# Patient Record
Sex: FEMALE
[De-identification: names, ages, dates, MRNs, and addresses within clinical notes are randomized per-mention and may not be internally consistent; named-entity substitution may affect disease eponyms.]

---

## 2019-12-24 ENCOUNTER — HOSPITAL ENCOUNTER (OUTPATIENT)
Dept: HOSPITAL 62 - RAD | Age: 59
End: 2019-12-24
Attending: PHYSICIAN ASSISTANT
Payer: COMMERCIAL

## 2019-12-24 DIAGNOSIS — M47.894: ICD-10-CM

## 2019-12-24 DIAGNOSIS — M51.34: Primary | ICD-10-CM

## 2019-12-24 PROCEDURE — 72146 MRI CHEST SPINE W/O DYE: CPT

## 2019-12-24 NOTE — RADIOLOGY REPORT (SQ)
EXAM DESCRIPTION:  MRI THORACIC SPINE WITHOUT



COMPLETED DATE/TIME:  12/24/2019 11:24 am



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
AL DISC DEGENERATION, THORACIC WAYLON



COMPARISON:  None.



TECHNIQUE:  Sagittal and Axial imaging includes T1, T2, STIR and gradient echo sequences.



LIMITATIONS:  None.



FINDINGS:  LOCALIZER: No worrisome findings.

ALIGNMENT: Exaggerated thoracic kyphosis.

VERTEBRAE: Intact.

BONE MARROW: Normal. No marrow replacement or reactive changes.

HARDWARE: None in the spine.

CORD: Normal in size and signal intensity.

SOFT TISSUES: No soft tissue masses.

THORACIC DISCS T1-T12: Multilevel disc space narrowing.  There is effacement anterior thecal sac and 
mild mass effect on the cord at T6-T7 due to disc/osteophyte complex.  Focal left paracentral protrus
ion at T8 which results in mass effect on the sac and slight impingement of the cord.  Marked facet a
rthropathy at T10-T11 which indents the posterolateral aspect of thecal sac.  This along with annular
 bulging results in mild central stenosis at T10-T11.

LOWER CERVICAL: Incompletely imaged. No significant spinal stenosis or exit foraminal stenosis.

UPPER LUMBAR: Incompletely imaged.  No significant spinal stenosis or exit foraminal stenosis.

OTHER: No other significant finding.



IMPRESSION:  Multilevel spondylosis.  There is a disc/osteophyte complex at T6-T7 which results in mi
ld mass effect on the sac and cord.  There is a focal left protrusion at T8 which results in mass eff
ect on the thecal sac and cord.  Marked facet arthropathy at T10-T11 which results and mild central s
tenosis at this level.



TECHNICAL DOCUMENTATION:  JOB ID:  [id number]

 2011 [employer]- All Rights Reserved



Reading location - IP/workstation name: [email or web address]